# Patient Record
Sex: MALE | Race: WHITE | Employment: OTHER | URBAN - METROPOLITAN AREA
[De-identification: names, ages, dates, MRNs, and addresses within clinical notes are randomized per-mention and may not be internally consistent; named-entity substitution may affect disease eponyms.]

---

## 2019-09-05 ENCOUNTER — HOSPITAL ENCOUNTER (OUTPATIENT)
Dept: MRI IMAGING | Age: 84
Discharge: HOME | End: 2019-09-05
Attending: PHYSICIAN ASSISTANT
Payer: MEDICARE

## 2019-09-05 DIAGNOSIS — R41.3 MEMORY LOSS: ICD-10-CM

## 2019-09-05 PROCEDURE — 70551 MRI BRAIN STEM W/O DYE: CPT

## 2019-10-08 ENCOUNTER — OFFICE VISIT (OUTPATIENT)
Dept: NEUROLOGY | Age: 84
End: 2019-10-08

## 2019-10-08 DIAGNOSIS — F41.8 ANXIETY ABOUT HEALTH: ICD-10-CM

## 2019-10-08 DIAGNOSIS — R41.844 EXECUTIVE FUNCTION DEFICIT: ICD-10-CM

## 2019-10-08 DIAGNOSIS — R41.3 MEMORY LOSS: Primary | ICD-10-CM

## 2019-10-08 DIAGNOSIS — R41.840 ATTENTION AND CONCENTRATION DEFICIT: ICD-10-CM

## 2019-10-08 DIAGNOSIS — R47.89 WORD FINDING DIFFICULTY: ICD-10-CM

## 2019-10-08 DIAGNOSIS — F81.9 LEARNING DIFFICULTY DUE TO COGNITIVE LIMITATIONS: ICD-10-CM

## 2019-10-08 DIAGNOSIS — R41.9 DEFICIT IN COMPREHENSION: ICD-10-CM

## 2019-10-08 NOTE — PROGRESS NOTES
[facility]  Neuroscience   [facility]. [facility] [place], [address]  Office:  [phone]  Fax: [phone]                  Initial Office Exam  Patient Name: Wendy Cortez Sr  Age: 80 y.o. Gender: male   Handedness: right handed   Presenting Concern: cognitive complaints    Primary Care Physician: Gianni Talbert MD  Referring Provider: Mannie Mendoza PA-C      REASON FOR REFERRAL:  This comprehensive and medically necessary neuropsychological assessment was requested to assist a differential diagnosis of cognitive complaints. The use and purpose of this examination, as well as the extent and limitations of confidentiality, were explained prior to obtaining permission to participate. Instructions were provided regarding the necessity to put forth optimal effort and answer questions truthfully in order to obtain reliable and accurate test results. REVIEW OF RECORDS:  Mr. Mark Cabrera was referred by neurology where he has been followed for memory loss. Diagnostic impressions include moderate cognitive impairment due to early stage dementia versus normal aging. Compounding variables are hearing loss and recent stressors related to moving from independent to assisted living. Recent MOCA score was 17/30. Medical records indicate that in 2019, Mr. Mark Cabrera was hospitalized following a GI bleed which involved acute confusion. Since that time, agitation and sundowning behaviors have been observed by family. Labs were unremarkable. An EEG on 19 showed mild, generalized slowing, considered a nonspecific indicator of cerebral dysfunction from any cause including toxic/metabolic etiologies as well as dementia. An MRI on 2019 showed the followin. No evidence of acute infarct or other acute intracranial finding. 2. 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   3. Mild white matter signal changes nonspecific likely chronic microvascular  ischemic disease. Current Outpatient Medications   Medication Sig    calcium-cholecalciferol, D3, (CALTRATE 600+D) tablet     REFRESH TEARS 0.5 % drop ophthalmic solution     RESTASIS 0.05 % ophthalmic emulsion     gabapentin (NEURONTIN) 600 mg tablet     MUCINEX 600 mg ER tablet     traMADol (ULTRAM) 50 mg tablet     furosemide (LASIX) 20 mg tablet One q d 6 hours before bedtime    potassium chloride (K-DUR, KLOR-CON) 10 mEq tablet One q day 6 hours before bedtime    esomeprazole (NEXIUM) 40 mg capsule Take  by mouth daily.  lisinopril (PRINIVIL, ZESTRIL) 10 mg tablet Take 20 mg by mouth daily.  amlodipine (NORVASC) 10 mg tablet Take  by mouth daily.  hydrocortisone (HYTONE) 2.5 % topical cream Apply  to affected area two (2) times a day. use thin layer    tamsulosin (FLOMAX) 0.4 mg capsule Take 0.4 mg by mouth daily.  finasteride (PROSCAR) 5 mg tablet Take 5 mg by mouth daily.  clotrimazole (LOTRIMIN) 1 % topical cream Apply  to affected area two (2) times a day.  BENEFIBER, GUAR GUM, PO Take  by mouth.  polyethylene glycol (MIRALAX) 17 gram packet Take 17 g by mouth daily.  spironolactone (ALDACTONE) 25 mg tablet Take  by mouth daily.  NITROGLYCERIN (NITROSTAT SL) by SubLINGual route.  cyanocobalamin 1,000 mcg tablet Take 1,000 mcg by mouth daily.  levothyroxine (SYNTHROID) 50 mcg tablet Take  by mouth Daily (before breakfast).  FERROUS FUMARATE (IRON PO) Take  by mouth every seven (7) days.  ascorbic acid (VITAMIN C) 500 mg tablet Take  by mouth every seven (7) days.  GLUCOSAMINE HCL/CHONDRO MOSES A (GLUCOSAMINE-CHONDROITIN PO) Take  by mouth.  simvastatin (ZOCOR) 40 mg tablet Take  by mouth nightly.  metoprolol-XL (TOPROL XL) 100 mg XL tablet Take 50 mg by mouth daily.  aspirin 81 mg tablet Take 81 mg by mouth.  oxybutynin chloride (DITROPAN XL) 15 mg CR tablet Take 15 mg by mouth daily.      No current facility-administered medications for this visit. Past Medical History:   Diagnosis Date    Anemia NEC 1995    Arthritis     CAD (coronary artery disease)     Hiatal hernia     Hypertension     Hypertonicity of bladder     Hypertrophy of prostate with urinary obstruction and other lower urinary tract symptoms (LUTS) 9/22/2011    Urinary frequency        Past Surgical History:   Procedure Laterality Date    CARDIAC SURG PROCEDURE UNLIST  1994    bypass    HX HEENT  1999    HX HEENT      teeth implant    HX HERNIA REPAIR  1959    HX ORTHOPAEDIC  1993    joint replacement, finger     CLINICAL INTERVIEW:  Mr. Swetha Ivan arrived on time for his scheduled appointment. He was accompanied by his daughter-in-law who participated in the clinical interview. Consistent with records, they reported difficulties with learning, attention, memory, word-finding, comprehension, and executive function. The symptoms were first noted several years ago but showed a marked change coinciding with Mr. Nikolas Chambers hospitalization in February 2019. Since that time, he has shown periods of disorientation. For example, has been wandering at night his adult children maintain. Electronic monitoring such that they are alerted if he tries to leave his apartment during nighttime hours. He is provided with supervision from 8 AM to 2 PM and then again from 3 PM to 9 PM.  Mr. Swetha Ivan has also been observed to put on 4 shirts at a time and multiple pairs of pants. His microwave was recently taken out of the home due to putting unsafe items in it, including Styrofoam.  The family plans to relocate him to a level to assisted living facility this week where he will be provided with supervision and assistance with medication management, redirection as appropriate, safety monitoring for ambulation, and scheduled meals. There is no known family history of neurological illness.     With regard to emotional functioning, Mr. Swetha Ivan denied a previous history of psychiatric illness. At the time of this interview, he reported minimal depressive symptomatology. Psychosocial stressors were denied. However, it should be noted that due to his age, Mr. Swetha Ivan has lost many family members and friends. Socially, Mr. Swetha Ivan is . His adult children are highly supportive and involved. He does not exercise on a regular basis or maintain a balanced, nutritional diet. Academically, he completed 12 years of education and denied a previous history of LD or ADHD. He is retired from the [employer] Hobbies include watching television and playing [employer] once a week and attending the [employer] once a week. Functionally, Mr. Swetha Ivan is dependent for medication and financial management. His adult son has medical and financial POA. Mr. Swetha Ivan was previously independent for ADL care but is now requiring increasing assistance with meal preparation, toileting, and bathing. He is no longer driving. MENTAL STATUS:    Sensorium  Awake, Aware, Alert   Orientation person, place and year   Relations cooperative   Eye Contact appropriate   Appearance:  age appropriate   Motor Behavior:  gait unsteady   Speech:  normal pitch and normal volume   Vocabulary average   Thought Process: within normal limits   Thought Content free of delusions and free of hallucinations   Suicidal ideations none   Homicidal ideations none   Mood:  euthymic   Affect:  mood-congruent   Memory recent  impaired   Memory remote:  adequate   Concentration:  impaired   Abstraction:  abstract   Insight:  limited   Reliability fair   Judgment:  limited         DIAGNOSTIC IMPRESSIONS:  1. Cognitive Decline: R/O Major Neurocognitive Disorder  2. Anxiety about health    PLAN:  1. Complete a comprehensive neuropsychological assessment to provide a differential diagnosis of presenting concerns as well as to assist with disposition and treatment planning as appropriate.   2. Consider compensatory and remedial cognitive training. 3. Consider nonpharmacological interventions for mood disorder. 4. Consider an adaptive driving evaluation. 5. Consider referral for elder health nurse to provide an in-home functional assessment. 6. Consider placement issues to provide greater structure and supervision to ensure safety, health and well-being. [id number] x 1 Review of records. Face to face interview w/ patient. Determine test protocol: 60 minutes. Total 1 unit    Negra Camarillo, PHD  Licensed Clinical Psychologist    This note was created using voice recognition software. Despite editing, there may be syntax errors. This note will not be viewable in [address]

## 2019-10-28 ENCOUNTER — OFFICE VISIT (OUTPATIENT)
Dept: NEUROLOGY | Age: 84
End: 2019-10-28

## 2019-10-28 DIAGNOSIS — F01.518 MIXED VASCULAR AND NEURODEGENERATIVE DEMENTIA WITH BEHAVIORAL DISTURBANCE: Primary | ICD-10-CM

## 2019-11-04 NOTE — PROGRESS NOTES
[facility]  Neuroscience   [address] [place] [address]. [facility], [address]  Office:  [phone]  Fax: [phone]                  Neuropsychological Evaluation Report  Patient Name: Jacqueline Howard Sr  Age: 80 y.o. Gender: male   Handedness: right handed   Presenting Concern: cognitive complaints    Primary Care Physician: Octavio Marquez MD  Referring Provider: Alexa Alexander PA-C    PATIENT HISTORY (OBTAINED DURING INITIAL CLINICAL EVALUATION):    REASON FOR REFERRAL:  This comprehensive and medically necessary neuropsychological assessment was requested to assist a differential diagnosis of cognitive complaints. The use and purpose of this examination, as well as the extent and limitations of confidentiality, were explained prior to obtaining permission to participate. Instructions were provided regarding the necessity to put forth optimal effort and answer questions truthfully in order to obtain reliable and accurate test results. REVIEW OF RECORDS:  Mr. Juan Farley was referred by neurology where he has been followed for memory loss. Diagnostic impressions include moderate cognitive impairment due to early stage dementia versus normal aging. Compounding variables are hearing loss and recent stressors related to moving from independent to assisted living. Recent MOCA score was 17/30. Medical records indicate that in 2019, Mr. Juan Farley was hospitalized following a GI bleed which involved acute confusion. Since that time, agitation and sundowning behaviors have been observed by family. Labs were unremarkable. An EEG on 19 showed mild, generalized slowing, considered a nonspecific indicator of cerebral dysfunction from any cause including toxic/metabolic etiologies as well as dementia. An MRI on 2019 showed the followin. No evidence of acute infarct or other acute intracranial finding.   2. 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 3. Mild white matter signal changes nonspecific likely chronic microvascular  ischemic disease. Current Outpatient Medications   Medication Sig    calcium-cholecalciferol, D3, (CALTRATE 600+D) tablet     REFRESH TEARS 0.5 % drop ophthalmic solution     RESTASIS 0.05 % ophthalmic emulsion     gabapentin (NEURONTIN) 600 mg tablet     MUCINEX 600 mg ER tablet     traMADol (ULTRAM) 50 mg tablet     furosemide (LASIX) 20 mg tablet One q d 6 hours before bedtime    potassium chloride (K-DUR, KLOR-CON) 10 mEq tablet One q day 6 hours before bedtime    esomeprazole (NEXIUM) 40 mg capsule Take  by mouth daily.  lisinopril (PRINIVIL, ZESTRIL) 10 mg tablet Take 20 mg by mouth daily.  amlodipine (NORVASC) 10 mg tablet Take  by mouth daily.  hydrocortisone (HYTONE) 2.5 % topical cream Apply  to affected area two (2) times a day. use thin layer    tamsulosin (FLOMAX) 0.4 mg capsule Take 0.4 mg by mouth daily.  finasteride (PROSCAR) 5 mg tablet Take 5 mg by mouth daily.  clotrimazole (LOTRIMIN) 1 % topical cream Apply  to affected area two (2) times a day.  BENEFIBER, GUAR GUM, PO Take  by mouth.  polyethylene glycol (MIRALAX) 17 gram packet Take 17 g by mouth daily.  spironolactone (ALDACTONE) 25 mg tablet Take  by mouth daily.  NITROGLYCERIN (NITROSTAT SL) by SubLINGual route.  cyanocobalamin 1,000 mcg tablet Take 1,000 mcg by mouth daily.  levothyroxine (SYNTHROID) 50 mcg tablet Take  by mouth Daily (before breakfast).  FERROUS FUMARATE (IRON PO) Take  by mouth every seven (7) days.  ascorbic acid (VITAMIN C) 500 mg tablet Take  by mouth every seven (7) days.  GLUCOSAMINE HCL/CHONDRO MOSES A (GLUCOSAMINE-CHONDROITIN PO) Take  by mouth.  simvastatin (ZOCOR) 40 mg tablet Take  by mouth nightly.  metoprolol-XL (TOPROL XL) 100 mg XL tablet Take 50 mg by mouth daily.  aspirin 81 mg tablet Take 81 mg by mouth.     oxybutynin chloride (DITROPAN XL) 15 mg CR tablet Take 15 mg by mouth daily. No current facility-administered medications for this visit. Past Medical History:   Diagnosis Date    Anemia NEC 1995    Arthritis     CAD (coronary artery disease)     Hiatal hernia     Hypertension     Hypertonicity of bladder     Hypertrophy of prostate with urinary obstruction and other lower urinary tract symptoms (LUTS) 9/22/2011    Urinary frequency        CLINICAL INTERVIEW:  Mr. Chichi Sauer arrived on time for his scheduled appointment. He was accompanied by his daughter-in-law who participated in the clinical interview. Consistent with records, they reported difficulties with learning, attention, memory, word-finding, comprehension, and executive function. The symptoms were first noted several years ago but showed a marked change coinciding with Mr. Chichi Sauer' hospitalization in February 2019. Since that time, he has shown periods of disorientation. For example, he has been wandering at night. His children now use electronic monitoring such that they are alerted if he tries to leave his apartment during nighttime hours. He is provided with supervision from 8 AM to 2 PM and then again from 3 PM to 9 PM.  Mr. Chichi Sauer has also been observed to put on 4 shirts at a time and multiple pairs of pants. His microwave was recently taken out of the home due to putting unsafe items in it, including Styrofoam.  The family plans to relocate him to a level 2 assisted living facility this week where he will be provided with supervision and assistance with medication management, redirection as appropriate, safety monitoring for ambulation, and scheduled meals. There is no known family history of neurological illness. With regard to emotional functioning, Mr. Chichi Sauer denied a previous history of psychiatric illness. At the time of this interview, he reported minimal depressive symptomatology. Psychosocial stressors were denied.   However, it should be noted that due to his age, Mr. Zander Kowalski has lost many family members and friends. Socially, Mr. Zander Kowalski is . His adult children are highly supportive and involved. He does not exercise on a regular basis or maintain a balanced, nutritional diet. Academically, he completed 12 years of education and denied a previous history of LD or ADHD. He is retired from the [employer] Hobbies include watching television and playing Naver[employer]inoes once a week and attending the community center once a week. Functionally, Mr. Zander Kowalski is dependent for medication and financial management. His adult son has medical and financial POA. Mr. Zander Kowalski was previously independent for ADL care but is now requiring increasing assistance with meal preparation, toileting, and bathing. He is no longer driving. MENTAL STATUS:    Sensorium  Awake, Aware, Alert   Orientation person, place and year   Relations cooperative   Eye Contact appropriate   Appearance:  age appropriate   Motor Behavior:  gait unsteady   Speech:  normal pitch and normal volume   Vocabulary average   Thought Process: within normal limits   Thought Content free of delusions and free of hallucinations   Suicidal ideations none   Homicidal ideations none   Mood:  euthymic   Affect:  mood-congruent   Memory recent  impaired   Memory remote:  adequate   Concentration:  impaired   Abstraction:  abstract   Insight:  limited   Reliability fair   Judgment:  limited       METHODS OF ASSESSMENT (Current Evaluation):  Clinician Administered:   Adaptive Behavior Assessment System (ABAS-3)  Clinical Assessment of Geriatric Emotional Status (CASE)  Clock Drawing Task  Geriatric Depression Scale: Short Form (GDS)  Modified Mini-Mental Status Exam (3MS)    Technician Administered:  Fariha Dementia Rating Scale-2  Neuropsychological Assessment Battery-Select Subtests  Test of Premorbid Functioning (TOPF)  Trailmaking Test    TEST OBSERVATIONS:  Mr. Zander Kowalski arrived promptly for the testing session. Dress and grooming were appropriate; physical presentation was unchanged from that observed during the clinical interview. Speech was fluent and coherent with normal rate, rhythm, tone, and volume. No expressive language deficits were noted but comprehension for more complex instructions was limited. No unusual behaviors or psychomotor abnormalities were observed. Thought process was inattentive and distractible. Thought content showed no apparent delusional ideation. Auditory and visual hallucinations were denied and there was no obvious response to internal stimuli. Affect was congruent with the euthymic mood conveyed. Mr. Jorge Carbone was adequately cooperative and appeared to put forth good effort throughout this examination. Rapport with the examiner was adequately established and maintained. Continuous prompting was required. Accordingly, test findings below do not appear to be the product of disingenuous effort. Given the above observations, plus comments contained in the Mental Status section, the results of this examination are regarded as reasonably reliable and valid. TEST RESULTS:  Quantitative test results are derived from comparisons to age and education corrected cohort normative data, where applicable. Percentiles are included in these instances. Qualitative test results are determined using clinical observations. General Orientation and Awareness:       Orientation to person yes   Time no  Place yes  Circumstance yes                     Sensory-Perceptual and Motor Functioning:    Visual and auditory acuity:  Glasses Worn       Gait and balance:   Ambulated by walker                 Cognitive Screening: On the Modified Mini-Mental Status Exam, Mr. Jorge Carbone obtained a moderately impaired score.  Difficulties were noted in the following areas: mental reversal, immediate and delayed spontaneous recall, orientation (unable to identify month, date, and day of the week), verbal fluency, and figure copy. Clock drawing was significant for inability to place hands on the clock. Attention/Concentration:       Simple visuomotor tracking (<1 percentile)                   Severely Impaired    Language: Auditory comprehension (1 percentile)                  Severely Impaired   Naming (7 percentile)          Mildly Impaired    Cognitive Tests of Executive Functioning:     Ability to think flexibly, Trailmaking B                     Discontinued due to confusion    Dementia Rating Scale -2  Scale:     Age-Corrected MOANS Scaled Score  Percentile  Attention     10       41-59  Initiation and Preservation   2       <1   Construction    10       41-59  Conceptualization    7       11-18  Memory     2       <1  Total Score     2       <1    Intellectual and Basic Cognitive Functioning:  ACS Test of pre-morbid functioning reading recognition lower limit estimated WAIS-IV FSIQ score:       Estimated full scale IQ: 86   Percentile: 17.5     Rating: Low Average    Adaptive Behavior Measure for Independent Living (NAB-Daily Living):  Simple judgment in daily decision making (58 percentile)              Average     Adaptive Behavior (Adaptive Behavior Assessment System)  General Adaptive Composite:  75   Percentile: 5  Low   Conceptual:  72    Percentile: 3  Low   Social:  77    Percentile: 6  Low   Practical:  78    Percentile: 7  Low    Emotional Functioning:    Screening of Emotional/Psychiatric Status:  Level of self-reported depression   (4/63)  Normal    On a measure of emotional functioning completed by his daughter, the following were reported: Severe anxiety and confusion, agitation, cognitive problems, dysphoric mood, compulsive behaviors and rigid thought, hypervigilance, and unusual perceptions and sensations. IMPRESSIONS/RECOMMENDATIONS:  Overall impressions are consistent with a moderate dementia of mixed etiology (based on imaging and EEG).   Deficiencies were noted in the following areas: processing speed, auditory comprehension, confrontational naming, cognitive flexibility, initiation/perseveration, and memory. Attention and constructional praxis emerged as areas of preserved ability. Simple judgment was also tested in the average range and support Mr. Teddy Mcguire ability to make simple, every day decisions (i.e. a choice of clothes, food, etc.). Along with the cognitive deficits revealed in this evaluation, significant functional impairment was evidenced on a self-report measure completed by Mr. Teddy Kraus' daughter. Taken together, Mr. Teddy Mcguire current neurocognitive functioning revealed the immediate need for him to reside in a setting with additional supervision and assistance. Fortunately, this has already been arranged by family. It is also fortunate that Mr. Teddy Kraus already has POA designations because from a strictly neuropsychological standpoint, Mr. Teddy Kraus is lacking the cognitive capacity to make medical and financial decisions, vote, , or to own a firearm. In addition to increased supervision and assistance, it is likely that Mr. Teddy Kraus will benefit from a psychiatric consultation as his family reports agitation and other neuropsychiatric symptomatology, common features of dementia. In addition to continued medical care, behavior and social activation commensurate with Mr. Boyd Olivier preserved abilities is strongly encouraged, this in addition to a brain fitness regimen (sleep hygiene, physician-approved level of exercise, healthy diet, mentally stimulating activities, minimal alcohol consumption, avoid nicotine). Finally, due to a history of delirium, Mr. Teddy Mcguire care team is encouraged to monitor for an abrupt change in functioning with workup for reversible medical causes as indicated. DIAGNOSTIC IMPRESSIONS:  1.  Mixed Dementia, moderate, with behavioral disturbance (agitation, dysphoric mood)      Thank you for allowing me the opportunity to assist you in Mr. Teddy Kraus Sr's care. Please do not hesitate to contact me should you have additional questions that I may not have addressed. 35365 x 1  96137 x 1  96138 x 1  96139 x 4  96132 x 1  96133 x 791 DEVIN Dukes, PHD  Licensed Clinical Psychologist        Time Documentation:     21294 x 1 Neuropsych testing/data gathering by Neuropsychologist (35 additional minutes, see above)   [phone] 1  96139 x 6 Test Administration/Data Gathering By Technician: (3.5 hours). 10110 x 1 (first 30 minutes), 73498 x 7 (each additional 30 minutes)     96132 x 1  96133 x 1 Testing Evaluation Services by Neuropsychologist (1 hour, 50 minutes) 96132 x 1 (first hour), 96133 x 1 (50 minutes)     Definitions:       59270/20863:  Neurobehavioral Status Exam, Clinical interview. Clinical assessment of thinking, reasoning and judgment, by neuropsychologist, both face to face time with patient and time interpreting those test results and reporting, first and subsequent hours)    12525/70102: Neuropsychological Test Administration by Neuropsychologist, first 30 minutes and each additional 30 minutes. 84147/13629: Neuropsychological Test Administration by Technician/Psychometrist, first 30 minutes and each additional 30 minutes. The above includes: Record review. Review of history provided by patient. Review of collaborative information. Testing by Clinician. Review of raw data. Scoring. Report writing of individual tests administered by Clinician. Integration of individual tests administered by psychometrist with NSE/testing by clinician, review of records/history/collaborative information, case Conceptualization, treatment planning, clinical decision making, report writing, coordination Of Care.  Psychometry test codes as time spent by psychometrist administering and scoring neurocognitive/psychological tests under supervision of neuropsychologist.  Integral services including scoring of raw data, data interpretation, case conceptualization, report writing etcetera were initiated after the patient finished testing/raw data collected and was completed on the date the report was signed. This note will not be viewable in [address] This note was created using voice recognition software. Despite editing, there may be syntax errors. I have reviewed the documentation provided by Dr. Zacarias Ventura, PhD, Sadie Coburn. Dr. Maria Alejandra Hess is in her second year of Fellowship in Clinical Neuropsychology. Dr. Maria Alejandra Hess is licensed and credentialed to practice in the [facility], is providing the current services via her NPI and licensure, and had been providing similar services prior to her employment with [employer]. No additional insurance billing is done by me on her cases, my NPI is not being used, etc.   I have not had any face to face engagement with her patients, and am providing supervision and consultation services with her as she works towards advancing her career and subspecialities. I have reviewed the history, the neurocognitive and psychological test results, the medical records available, and the impressions and recommendations generated by Dr. Maria Alejandra Hess. We have engaged in peer to peer supervision as needed. I have reviewed history noted in the records, the tests administered, the test scores, and the overall case history and profile and report generated by Dr. Maria Alejandra Hess. Dr. Elise Francis clinical case formulation, diagnostic impressions, and the proposed management plans/treatment recommendations are her own and based on her clinical training, level of expertise, and judgment. Any additional comments, concerns, or recommendations that I am making are offered here:  Test results are consistent with a moderate form of dementia (and it looks as though it is headed towards the severe range) with some behavioral changes.   There are already numerous accommodations and services in place and this is reassuring given my agreement that he is lacking capacity and needs supervision for most, if not all ADLs. Prognosis is guarded and monitor closely for any abrupt changes in cognitive functioning as this would raise concern for delirium/acute encephalopathy superimposed upon dementia type concerns. Adán Leonard, ANGELA  Neuropsychology

## 2019-11-14 ENCOUNTER — OFFICE VISIT (OUTPATIENT)
Dept: NEUROLOGY | Age: 84
End: 2019-11-14

## 2019-11-14 DIAGNOSIS — F01.518 MIXED VASCULAR AND NEURODEGENERATIVE DEMENTIA WITH BEHAVIORAL DISTURBANCE: Primary | ICD-10-CM

## 2019-11-14 NOTE — PROGRESS NOTES
Interactive Psychotherapy/office feedback        Interactive office feedback session with Mr. Sarah Tolliver daughter in law. I reviewed the results of the recent Neuropsychological Evaluation  including the observed areas of neurocognitive strengths and weaknesses. Education was provided regarding my diagnostic impressions, and treatment plan/options were discussed. I also answered numerous questions related to the clinical findings, including the various methods to improve cognition and mood. CBT, psychoeducation, and supportive psychotherapy techniques were utilized. Prior to seeing the patient I reviewed the records, including the previously completed report, the records in [place], and any updated visits from other providers since I saw the patient last.       Diagnoses:      1. Mixed dementia, moderate       The patient will follow up with the referring provider, and reported being very pleased with the services provided. [id number] with Mr. Sarah Tolliver daughter in law. 45 minutes       This note will not be viewable in [facility]. This note was created using voice recognition software. Despite editing, there may be syntax errors.